# Patient Record
Sex: FEMALE | Race: BLACK OR AFRICAN AMERICAN | URBAN - METROPOLITAN AREA
[De-identification: names, ages, dates, MRNs, and addresses within clinical notes are randomized per-mention and may not be internally consistent; named-entity substitution may affect disease eponyms.]

---

## 2017-01-06 ENCOUNTER — PRENATAL OFFICE VISIT (OUTPATIENT)
Dept: MIDWIFE SERVICES | Facility: CLINIC | Age: 27
End: 2017-01-06
Payer: COMMERCIAL

## 2017-01-06 VITALS
DIASTOLIC BLOOD PRESSURE: 62 MMHG | BODY MASS INDEX: 30.8 KG/M2 | SYSTOLIC BLOOD PRESSURE: 98 MMHG | WEIGHT: 190.3 LBS | HEART RATE: 90 BPM | OXYGEN SATURATION: 96 %

## 2017-01-06 DIAGNOSIS — R11.0 NAUSEA: ICD-10-CM

## 2017-01-06 DIAGNOSIS — O99.820 GBS (GROUP B STREPTOCOCCUS CARRIER), +RV CULTURE, CURRENTLY PREGNANT: ICD-10-CM

## 2017-01-06 DIAGNOSIS — N89.8 VAGINAL ITCHING: Primary | ICD-10-CM

## 2017-01-06 LAB
MICRO REPORT STATUS: ABNORMAL
SPECIMEN SOURCE: ABNORMAL
WET PREP SPEC: ABNORMAL

## 2017-01-06 PROCEDURE — 87210 SMEAR WET MOUNT SALINE/INK: CPT | Performed by: ADVANCED PRACTICE MIDWIFE

## 2017-01-06 PROCEDURE — 99212 OFFICE O/P EST SF 10 MIN: CPT | Performed by: ADVANCED PRACTICE MIDWIFE

## 2017-01-06 RX ORDER — METRONIDAZOLE 500 MG/1
500 TABLET ORAL 2 TIMES DAILY
Qty: 14 TABLET | Refills: 0 | Status: SHIPPED | OUTPATIENT
Start: 2017-01-06 | End: 2017-01-13

## 2017-01-06 RX ORDER — TERCONAZOLE 0.4 %
1 CREAM WITH APPLICATOR VAGINAL AT BEDTIME
Qty: 45 G | Refills: 0 | Status: SHIPPED | OUTPATIENT
Start: 2017-01-06 | End: 2017-01-13

## 2017-01-06 RX ORDER — ONDANSETRON 4 MG/1
4 TABLET, FILM COATED ORAL EVERY 8 HOURS PRN
Qty: 18 TABLET | Refills: 0 | Status: ON HOLD | OUTPATIENT
Start: 2017-01-06 | End: 2017-01-21

## 2017-01-06 NOTE — PROGRESS NOTES
Is having recurrent yeast and BV issues.  Treated with MetroGel with vaginal irritation so changed to yeast OTC.  Feels BV is back.   Vaginal pH is >6 consistant with BV.  Discussed ctx and signs of labor with baby #3.  Phone number to call.  GBS is positive and knows to call if SROM or labor.  Last child had staph skin infection post delivery.  Etiology not clear.  Treated with topical and oral antibiotics.  ASSESSMENT: 37w6d  Yeast and BV on wet prep.    PLAN: Terazol and Flagyl ordered along with Zofran for periodic nausea at term.   ESHA

## 2017-01-20 ENCOUNTER — HOSPITAL ENCOUNTER (INPATIENT)
Facility: CLINIC | Age: 27
LOS: 2 days | Discharge: HOME HEALTH | End: 2017-01-22
Attending: ADVANCED PRACTICE MIDWIFE | Admitting: ADVANCED PRACTICE MIDWIFE
Payer: COMMERCIAL

## 2017-01-20 ENCOUNTER — PRENATAL OFFICE VISIT (OUTPATIENT)
Dept: MIDWIFE SERVICES | Facility: CLINIC | Age: 27
End: 2017-01-20
Payer: COMMERCIAL

## 2017-01-20 ENCOUNTER — TELEPHONE (OUTPATIENT)
Dept: NURSING | Facility: CLINIC | Age: 27
End: 2017-01-20

## 2017-01-20 VITALS
HEART RATE: 89 BPM | DIASTOLIC BLOOD PRESSURE: 67 MMHG | OXYGEN SATURATION: 98 % | BODY MASS INDEX: 31.4 KG/M2 | WEIGHT: 194 LBS | SYSTOLIC BLOOD PRESSURE: 91 MMHG

## 2017-01-20 DIAGNOSIS — Z34.80 SUPERVISION OF OTHER NORMAL PREGNANCY, ANTEPARTUM: ICD-10-CM

## 2017-01-20 DIAGNOSIS — D50.8 IRON DEFICIENCY ANEMIA SECONDARY TO INADEQUATE DIETARY IRON INTAKE: Primary | ICD-10-CM

## 2017-01-20 DIAGNOSIS — O99.820 GBS (GROUP B STREPTOCOCCUS CARRIER), +RV CULTURE, CURRENTLY PREGNANT: Primary | ICD-10-CM

## 2017-01-20 DIAGNOSIS — D50.8 OTHER IRON DEFICIENCY ANEMIA: ICD-10-CM

## 2017-01-20 LAB
ABO + RH BLD: NORMAL
ABO + RH BLD: NORMAL
HGB BLD-MCNC: 9.7 G/DL (ref 11.7–15.7)
SPECIMEN EXP DATE BLD: NORMAL

## 2017-01-20 PROCEDURE — 00000218 ZZHCL STATISTIC OBHBG - HEMOGLOBIN: Performed by: ADVANCED PRACTICE MIDWIFE

## 2017-01-20 PROCEDURE — 25000125 ZZHC RX 250: Performed by: ADVANCED PRACTICE MIDWIFE

## 2017-01-20 PROCEDURE — 99215 OFFICE O/P EST HI 40 MIN: CPT

## 2017-01-20 PROCEDURE — 12000032 ZZH R&B OB CRITICAL UMMC

## 2017-01-20 PROCEDURE — 25000128 H RX IP 250 OP 636: Performed by: ADVANCED PRACTICE MIDWIFE

## 2017-01-20 PROCEDURE — 25000125 ZZHC RX 250

## 2017-01-20 PROCEDURE — 99212 OFFICE O/P EST SF 10 MIN: CPT | Performed by: ADVANCED PRACTICE MIDWIFE

## 2017-01-20 PROCEDURE — 36416 COLLJ CAPILLARY BLOOD SPEC: CPT | Performed by: ADVANCED PRACTICE MIDWIFE

## 2017-01-20 PROCEDURE — 86900 BLOOD TYPING SEROLOGIC ABO: CPT | Performed by: ADVANCED PRACTICE MIDWIFE

## 2017-01-20 PROCEDURE — 86901 BLOOD TYPING SEROLOGIC RH(D): CPT | Performed by: ADVANCED PRACTICE MIDWIFE

## 2017-01-20 PROCEDURE — 12000030 ZZH R&B OB INTERMEDIATE UMMC

## 2017-01-20 RX ORDER — FENTANYL CITRATE 50 UG/ML
50-100 INJECTION, SOLUTION INTRAMUSCULAR; INTRAVENOUS
Status: DISCONTINUED | OUTPATIENT
Start: 2017-01-20 | End: 2017-01-21

## 2017-01-20 RX ORDER — NALOXONE HYDROCHLORIDE 0.4 MG/ML
.1-.4 INJECTION, SOLUTION INTRAMUSCULAR; INTRAVENOUS; SUBCUTANEOUS
Status: DISCONTINUED | OUTPATIENT
Start: 2017-01-20 | End: 2017-01-21

## 2017-01-20 RX ORDER — SODIUM CHLORIDE, SODIUM LACTATE, POTASSIUM CHLORIDE, CALCIUM CHLORIDE 600; 310; 30; 20 MG/100ML; MG/100ML; MG/100ML; MG/100ML
INJECTION, SOLUTION INTRAVENOUS CONTINUOUS
Status: DISCONTINUED | OUTPATIENT
Start: 2017-01-20 | End: 2017-01-21

## 2017-01-20 RX ORDER — OXYTOCIN/0.9 % SODIUM CHLORIDE 30/500 ML
PLASTIC BAG, INJECTION (ML) INTRAVENOUS
Status: COMPLETED
Start: 2017-01-20 | End: 2017-01-21

## 2017-01-20 RX ORDER — MICONAZOLE NITRATE 2 %
1 CREAM WITH APPLICATOR VAGINAL AT BEDTIME
Status: ON HOLD | COMMUNITY
End: 2017-01-21

## 2017-01-20 RX ORDER — PENICILLIN G POTASSIUM 5000000 [IU]/1
INJECTION, POWDER, FOR SOLUTION INTRAMUSCULAR; INTRAVENOUS
Status: COMPLETED
Start: 2017-01-20 | End: 2017-01-20

## 2017-01-20 RX ORDER — METHYLERGONOVINE MALEATE 0.2 MG/ML
200 INJECTION INTRAVENOUS
Status: DISCONTINUED | OUTPATIENT
Start: 2017-01-20 | End: 2017-01-21

## 2017-01-20 RX ORDER — OXYCODONE AND ACETAMINOPHEN 5; 325 MG/1; MG/1
1 TABLET ORAL
Status: DISCONTINUED | OUTPATIENT
Start: 2017-01-20 | End: 2017-01-21

## 2017-01-20 RX ORDER — IBUPROFEN 800 MG/1
800 TABLET, FILM COATED ORAL
Status: DISCONTINUED | OUTPATIENT
Start: 2017-01-20 | End: 2017-01-21

## 2017-01-20 RX ORDER — CARBOPROST TROMETHAMINE 250 UG/ML
250 INJECTION, SOLUTION INTRAMUSCULAR
Status: DISCONTINUED | OUTPATIENT
Start: 2017-01-20 | End: 2017-01-21

## 2017-01-20 RX ORDER — SODIUM CHLORIDE, SODIUM LACTATE, POTASSIUM CHLORIDE, CALCIUM CHLORIDE 600; 310; 30; 20 MG/100ML; MG/100ML; MG/100ML; MG/100ML
INJECTION, SOLUTION INTRAVENOUS
Status: DISCONTINUED
Start: 2017-01-20 | End: 2017-01-21 | Stop reason: HOSPADM

## 2017-01-20 RX ORDER — OXYTOCIN 10 [USP'U]/ML
INJECTION, SOLUTION INTRAMUSCULAR; INTRAVENOUS
Status: DISCONTINUED
Start: 2017-01-20 | End: 2017-01-21 | Stop reason: WASHOUT

## 2017-01-20 RX ORDER — PENICILLIN G POTASSIUM 5000000 [IU]/1
5 INJECTION, POWDER, FOR SOLUTION INTRAMUSCULAR; INTRAVENOUS ONCE
Status: COMPLETED | OUTPATIENT
Start: 2017-01-20 | End: 2017-01-20

## 2017-01-20 RX ORDER — OXYTOCIN/0.9 % SODIUM CHLORIDE 30/500 ML
100-340 PLASTIC BAG, INJECTION (ML) INTRAVENOUS CONTINUOUS PRN
Status: COMPLETED | OUTPATIENT
Start: 2017-01-20 | End: 2017-01-21

## 2017-01-20 RX ORDER — OXYTOCIN 10 [USP'U]/ML
10 INJECTION, SOLUTION INTRAMUSCULAR; INTRAVENOUS
Status: DISCONTINUED | OUTPATIENT
Start: 2017-01-20 | End: 2017-01-21

## 2017-01-20 RX ORDER — SODIUM CHLORIDE, SODIUM LACTATE, POTASSIUM CHLORIDE, CALCIUM CHLORIDE 600; 310; 30; 20 MG/100ML; MG/100ML; MG/100ML; MG/100ML
INJECTION, SOLUTION INTRAVENOUS
Status: DISCONTINUED
Start: 2017-01-20 | End: 2017-01-20 | Stop reason: WASHOUT

## 2017-01-20 RX ORDER — ACETAMINOPHEN 325 MG/1
650 TABLET ORAL EVERY 4 HOURS PRN
Status: DISCONTINUED | OUTPATIENT
Start: 2017-01-20 | End: 2017-01-21

## 2017-01-20 RX ORDER — ONDANSETRON 2 MG/ML
4 INJECTION INTRAMUSCULAR; INTRAVENOUS EVERY 6 HOURS PRN
Status: DISCONTINUED | OUTPATIENT
Start: 2017-01-20 | End: 2017-01-21

## 2017-01-20 RX ORDER — FAMOTIDINE 10 MG
40 TABLET ORAL 2 TIMES DAILY
Status: DISCONTINUED | OUTPATIENT
Start: 2017-01-20 | End: 2017-01-21

## 2017-01-20 RX ADMIN — FENTANYL CITRATE 100 MCG: 50 INJECTION, SOLUTION INTRAMUSCULAR; INTRAVENOUS at 22:05

## 2017-01-20 RX ADMIN — FENTANYL CITRATE 50 MCG: 50 INJECTION, SOLUTION INTRAMUSCULAR; INTRAVENOUS at 20:36

## 2017-01-20 RX ADMIN — FENTANYL CITRATE 100 MCG: 50 INJECTION, SOLUTION INTRAMUSCULAR; INTRAVENOUS at 23:14

## 2017-01-20 RX ADMIN — PENICILLIN G POTASSIUM 5 MILLION UNITS: 5000000 INJECTION, POWDER, FOR SOLUTION INTRAMUSCULAR; INTRAVENOUS at 20:06

## 2017-01-20 RX ADMIN — Medication 2.5 MILLION UNITS: at 23:58

## 2017-01-20 RX ADMIN — PENICILLIN G POTASSIUM 5 MILLION UNITS: 5000000 POWDER, FOR SOLUTION INTRAMUSCULAR; INTRAPLEURAL; INTRATHECAL; INTRAVENOUS at 20:06

## 2017-01-20 RX ADMIN — FENTANYL CITRATE 50 MCG: 50 INJECTION, SOLUTION INTRAMUSCULAR; INTRAVENOUS at 20:22

## 2017-01-20 NOTE — MR AVS SNAPSHOT
After Visit Summary   1/20/2017    Alisha Marie    MRN: [id number]           Patient Information     Date Of Birth          1990        Visit Information        Provider Department      1/20/2017 1:00 PM Mikayla Aaron CNM [facility]        Today's Diagnoses     GBS (group B Streptococcus carrier), +RV culture, currently pregnant    -  1     Supervision of other normal pregnancy, antepartum         Other iron deficiency anemia            Follow-ups after your visit        Follow-up notes from your care team     Return in about 1 week (around 1/27/2017) for Prenatal care with KAVYA, NST and US.      Future tests that were ordered for you today     Open Future Orders        Priority Expected Expires Ordered    US OB > 14 Weeks Complete Single Routine  1/20/2018 1/20/2017            Who to contact     If you have questions or need follow up information about today's clinic visit or your schedule please contact [facility] directly at [phone].  Normal or non-critical lab and imaging results will be communicated to you by Disqushart, letter or phone within 4 business days after the clinic has received the results. If you do not hear from us within 7 days, please contact the clinic through Disqushart or phone. If you have a critical or abnormal lab result, we will notify you by phone as soon as possible.  Submit refill requests through Talend or call your pharmacy and they will forward the refill request to us. Please allow 3 business days for your refill to be completed.          Additional Information About Your Visit        MyChart Information     Talend gives you secure access to your electronic health record. If you see a primary care provider, you can also send messages to your care team and make appointments. If you have questions, please call your primary care clinic.  If you do not have a primary care provider, please call [phone] and they will  assist you.        Care EveryWhere ID     This is your Care EveryWhere ID. This could be used by other organizations to access your [place] medical records  [id number]        Your Vitals Were     Pulse Pulse Oximetry Last Period Breastfeeding?          89 98% 04/16/2016 (Approximate) No         Blood Pressure from Last 3 Encounters:   01/20/17 91/67   01/06/17 98/62   12/22/16 92/67    Weight from Last 3 Encounters:   01/20/17 194 lb (87.998 kg)   01/06/17 190 lb 4.8 oz (86.32 kg)   12/22/16 191 lb (86.637 kg)              We Performed the Following     OB hemoglobin        Primary Care Provider    Physician No Ref-Primary       No address on file        Thank you!     Thank you for choosing [facility]  for your care. Our goal is always to provide you with excellent care. Hearing back from our patients is one way we can continue to improve our services. Please take a few minutes to complete the written survey that you may receive in the mail after your visit with us. Thank you!             Your Updated Medication List - Protect others around you: Learn how to safely use, store and throw away your medicines at www.disposemymeds.org.          This list is accurate as of: 1/20/17  1:50 PM.  Always use your most recent med list.                   Brand Name Dispense Instructions for use    famotidine 40 MG tablet    PEPCID    30 tablet    Take 1 tablet (40 mg) by mouth nightly as needed for heartburn       hydrocortisone valerate 0.2 % ointment    WEST-SUSSY    30 g    Apply topically 2 times daily       ondansetron 4 MG tablet    ZOFRAN    18 tablet    Take 1 tablet (4 mg) by mouth every 8 hours as needed for nausea       valACYclovir 500 MG tablet    VALTREX    6 tablet    Take 1 tablet (500 mg) by mouth 2 times daily

## 2017-01-20 NOTE — IP AVS SNAPSHOT
MRN:[id number]                      After Visit Summary   1/20/2017    Alisha Marie    MRN: [id number]           Thank you!     Thank you for choosing [facility] for your care. Our goal is always to provide you with excellent care. Hearing back from our patients is one way we can continue to improve our services. Please take a few minutes to complete the written survey that you may receive in the mail after you visit with us. Thank you!        Patient Information     Date Of Birth          1990        About your hospital stay     You were admitted on:  January 20, 2017 [facility]    You were discharged on:  January 22, 2017       Who to Call     For medical emergencies, please call 911.  For non-urgent questions about your medical care, please call your primary care provider or clinic, None          Attending Provider     Provider    Dania Milligan APRN CNM       Primary Care Provider    Physician No Ref-Primary       No address on file        Further instructions from your care team       Postpartum Vaginal Delivery Instructions    Activity       Ask family and friends for help when you need it.    Do not place anything in your vagina for 6 weeks.    You are not restricted on other activities, but take it easy for a few weeks to allow your body to recover from delivery.  You are able to do any activities you feel up to that point.    No driving until you have stopped taking your pain medications (usually two weeks after delivery).     Call your health care provider if you have any of these symptoms:       Increased pain, swelling, redness, or fluid around your stiches from an episiotomy or perineal tear.    A fever above 100.4 F (38 C) with or without chills when placing a thermometer under your tongue.    You soak a sanitary pad with blood within 1 hour, or you see blood clots larger than a golf ball.    Bleeding that lasts more than 6 weeks.    Vaginal  "discharge that smells bad.    Severe pain, cramping or tenderness in your lower belly area.    A need to urinate more frequently (use the toilet more often), more urgently (use the toilet very quickly), or it burns when you urinate.    Nausea and vomiting.    Redness, swelling or pain around a vein in your leg.    Problems breastfeeding or a red or painful area on your breast.    Chest pain and cough or are gasping for air.    Problems coping with sadness, anxiety, or depression.  If you have any concerns about hurting yourself or the baby, call your provider immediately.     You have questions or concerns after you return home.     Keep your hands clean:  Always wash your hands before touching your perineal area and stitches.  This helps reduce your risk of infection.  If your hands aren't dirty, you may use an alcohol hand-rub to clean your hands. Keep your nails clean and short.        Pending Results     Date and Time Order Name Status Description    1/22/2017 0630 Rh Immune Globulin Study In process             Statement of Approval     Ordered          01/22/17 0828  I have reviewed and agree with all the recommendations and orders detailed in this document.   EFFECTIVE NOW     Approved and electronically signed by:  Dania Milligan APRN CNM             Admission Information        Provider Department Dept Phone    1/20/2017 CHELSY Marquis CNM [facility] [phone]      Your Vitals Were     Blood Pressure Pulse Temperature    100/56 mmHg 72 97.7  F (36.5  C) (Oral)    Respirations Height Weight    16 1.676 m (5' 6\") 87.998 kg (194 lb)    BMI (Body Mass Index) Last Period       31.33 kg/m2 04/16/2016 (Approximate)       MyChart Information     Squarespace gives you secure access to your electronic health record. If you see a primary care provider, you can also send messages to your care team and make appointments. If you have questions, please call your primary care clinic.  If you do not have a primary " care provider, please call [phone] and they will assist you.        Care EveryWhere ID     This is your Care EveryWhere ID. This could be used by other organizations to access your [place] medical records  [id number]           Review of your medicines      START taking        Dose / Directions    * ferrous sulfate 325 (65 FE) MG tablet   Commonly known as:  IRON   Used for:  Iron deficiency anemia secondary to inadequate dietary iron intake        Dose:  325 mg   Take 1 tablet (325 mg) by mouth daily (with breakfast)   Quantity:  60 tablet   Refills:  0       * ferrous sulfate 325 (65 FE) MG tablet   Commonly known as:  IRON SUPPLEMENT   Used for:  Iron deficiency anemia secondary to inadequate dietary iron intake        Dose:  325 mg   Take 1 tablet (325 mg) by mouth daily (with breakfast)   Quantity:  100 tablet   Refills:  0       oxyCODONE 5 MG IR tablet   Commonly known as:  ROXICODONE   Used for:   (normal spontaneous vaginal delivery)        Dose:  5 mg   Take 1 tablet (5 mg) by mouth every 4 hours as needed for moderate to severe pain   Quantity:  10 tablet   Refills:  0       senna-docusate 8.6-50 MG per tablet   Commonly known as:  SENOKOT-S;PERICOLACE   Used for:   (normal spontaneous vaginal delivery)        Dose:  1 tablet   Take 1 tablet by mouth 2 times daily as needed for constipation   Quantity:  60 tablet   Refills:  1       * Notice:  This list has 2 medication(s) that are the same as other medications prescribed for you. Read the directions carefully, and ask your doctor or other care provider to review them with you.      CONTINUE these medicines which have NOT CHANGED        Dose / Directions    famotidine 40 MG tablet   Commonly known as:  PEPCID   Used for:  Reflex abnormality        Dose:  40 mg   Take 1 tablet (40 mg) by mouth nightly as needed for heartburn   Quantity:  30 tablet   Refills:  1       hydrocortisone valerate 0.2 % ointment   Commonly known as:  WEST-SUSSY    Used for:  Rash and nonspecific skin eruption        Apply topically 2 times daily   Quantity:  30 g   Refills:  1         STOP taking     FLAGYL PO           miconazole 2 % cream   Commonly known as:  MICATIN           ondansetron 4 MG tablet   Commonly known as:  ZOFRAN           valACYclovir 500 MG tablet   Commonly known as:  VALTREX                Where to get your medicines      These medications were sent to [facility] [place], [place] - [address]  [address] [facility] [address], [facility] [address]     Phone:  [phone]    - ferrous sulfate 325 (65 FE) MG tablet  - ferrous sulfate 325 (65 FE) MG tablet  - senna-docusate 8.6-50 MG per tablet      Some of these will need a paper prescription and others can be bought over the counter. Ask your nurse if you have questions.     Bring a paper prescription for each of these medications    - oxyCODONE 5 MG IR tablet             Protect others around you: Learn how to safely use, store and throw away your medicines at www.disposemymeds.org.             Medication List: This is a list of all your medications and when to take them. Check marks below indicate your daily home schedule. Keep this list as a reference.      Medications           Morning Afternoon Evening Bedtime As Needed    famotidine 40 MG tablet   Commonly known as:  PEPCID   Take 1 tablet (40 mg) by mouth nightly as needed for heartburn                                * ferrous sulfate 325 (65 FE) MG tablet   Commonly known as:  IRON   Take 1 tablet (325 mg) by mouth daily (with breakfast)   Last time this was given:  325 mg on 1/22/2017  8:01 AM                                * ferrous sulfate 325 (65 FE) MG tablet   Commonly known as:  IRON SUPPLEMENT   Take 1 tablet (325 mg) by mouth daily (with breakfast)   Last time this was given:  325 mg on 1/22/2017  8:01 AM                                hydrocortisone valerate 0.2 % ointment   Commonly known as:  WEST-SUSSY   Apply topically  2 times daily                                oxyCODONE 5 MG IR tablet   Commonly known as:  ROXICODONE   Take 1 tablet (5 mg) by mouth every 4 hours as needed for moderate to severe pain   Last time this was given:  10 mg on 1/22/2017  8:13 AM                                senna-docusate 8.6-50 MG per tablet   Commonly known as:  SENOKOT-S;PERICOLACE   Take 1 tablet by mouth 2 times daily as needed for constipation   Last time this was given:  2 tablets on 1/22/2017  8:01 AM                                * Notice:  This list has 2 medication(s) that are the same as other medications prescribed for you. Read the directions carefully, and ask your doctor or other care provider to review them with you.

## 2017-01-20 NOTE — IP AVS SNAPSHOT
[facility]    [address] [facility] [address] [facility] [address]    Phone:  [phone]                                       After Visit Summary   1/20/2017    Alisha Marie    MRN: [id number]           After Visit Summary Signature Page     I have received my discharge instructions, and my questions have been answered. I have discussed any challenges I see with this plan with the nurse or doctor.    ..........................................................................................................................................  Patient/Patient Representative Signature      ..........................................................................................................................................  Patient Representative Print Name and Relationship to Patient    ..................................................               ................................................  Date                                            Time    ..........................................................................................................................................  Reviewed by Signature/Title    ...................................................              ..............................................  Date                                                            Time

## 2017-01-20 NOTE — TELEPHONE ENCOUNTER
"Call Type: Triage Call    Presenting Problem: 'I am calling to let you know I am on myway to  the hospital.  They stripped my membranes today and now my  contractions are 5 minutes apart.\"  Call placed via  to S  midwife to call patient on her cell [phone] @ [phone]. Patient is  enroute. Third pregnancy.  Triage Note:  Guideline Title: Pregnancy: Signs of Labor, 37 Weeks or Greater  Recommended Disposition: Call Provider Immediately  Original Inclination:  Override Disposition:  Intended Action:  Physician Contacted: No  Previous childbirth AND moderate intensity contractions less than 5 minutes apart  for 1 hour or history of rapid delivery (less than 6 hours of labor) ?  YES  First childbirth with regular contractions for 1 hour and contractions are  feeling stronger and closer together. ? NO  New or worsening signs and symptoms that may indicate shock ? NO  Feeling of baby coming or wanting to push (urge to bear down) ? NO  Umbilical cord or any part of the baby (head, bottom, arm or leg) at the opening  of the vagina ? NO  Gestation 20 to 37 weeks ? NO  Gush or leakage of green or green-tinged or port-wine colored fluid (reddish and  watery) from the vagina ? NO  Decreased fetal movement (less than 10 kicks/movements within two hours or a  significant change in usual pattern compared to previous days) ? NO  No relief between contractions ? NO  Continuous bright red vaginal bleeding for more than 15 minutes (more than  spotting) ? NO  Physician Instructions:  Care Advice:  "

## 2017-01-20 NOTE — PROGRESS NOTES
Alisha is a 26 year old  @ 39+6 by LMP who presents for a scheduled  visit.  States that she is feeling well.  Denies vaginal leaking, bleeding, and discharge.  Is having irregular uterine contractions of varying intensity and frequency.  FHR 120s, + fetal movement, vertex presentation by leopold's. GBS +, will plan for intrapartum prophylaxis.  Last hemoglobin on 16 9.6.  Rechecked today and found to be 9.7.  Instructed Alisha to take her iron supplements BID-TID and to eat iron-rich foods.  Verbalized understanding and acknowledged importance of adequate iron stores during pregnancy, labor, and birth.  History of HSV, last outbreak in October.  Is currently taking valacyclovir as prophylaxis.  No lesions visible on external genitalia.  Had episode of BV/yeast on 17.  States treatment was successful and conditions have since resolved.  Cervix checked per request and found to be 3/50/-4, soft, and posterior.  Membranes swept per her request. Tolerated well. Gave Roberta and her partner detailed labor precautions and instructions on how to contact on-call midwife/L&D.  Verbalized understanding.  Will return to clinic for appt and postdates testing (US ordered) in one week if still pregnant.

## 2017-01-21 LAB — BLOOD BANK CMNT PATIENT-IMP: NORMAL

## 2017-01-21 PROCEDURE — 12000030 ZZH R&B OB INTERMEDIATE UMMC

## 2017-01-21 PROCEDURE — 25000125 ZZHC RX 250: Performed by: ADVANCED PRACTICE MIDWIFE

## 2017-01-21 PROCEDURE — 10907ZC DRAINAGE OF AMNIOTIC FLUID, THERAPEUTIC FROM PRODUCTS OF CONCEPTION, VIA NATURAL OR ARTIFICIAL OPENING: ICD-10-PCS | Performed by: ADVANCED PRACTICE MIDWIFE

## 2017-01-21 PROCEDURE — 72200001 ZZH LABOR CARE VAGINAL DELIVERY SINGLE

## 2017-01-21 PROCEDURE — 25000132 ZZH RX MED GY IP 250 OP 250 PS 637: Performed by: ADVANCED PRACTICE MIDWIFE

## 2017-01-21 PROCEDURE — 59410 OBSTETRICAL CARE: CPT | Performed by: ADVANCED PRACTICE MIDWIFE

## 2017-01-21 PROCEDURE — 25000125 ZZHC RX 250

## 2017-01-21 RX ORDER — FERROUS SULFATE 325(65) MG
325 TABLET ORAL
Qty: 60 TABLET | Refills: 0 | Status: SHIPPED | OUTPATIENT
Start: 2017-01-21 | End: 2017-03-15

## 2017-01-21 RX ORDER — AMOXICILLIN 250 MG
1-2 CAPSULE ORAL 2 TIMES DAILY
Status: DISCONTINUED | OUTPATIENT
Start: 2017-01-21 | End: 2017-01-22 | Stop reason: HOSPADM

## 2017-01-21 RX ORDER — IBUPROFEN 400 MG/1
400-800 TABLET, FILM COATED ORAL EVERY 6 HOURS PRN
Status: DISCONTINUED | OUTPATIENT
Start: 2017-01-21 | End: 2017-01-22 | Stop reason: HOSPADM

## 2017-01-21 RX ORDER — OXYTOCIN/0.9 % SODIUM CHLORIDE 30/500 ML
100 PLASTIC BAG, INJECTION (ML) INTRAVENOUS CONTINUOUS
Status: DISCONTINUED | OUTPATIENT
Start: 2017-01-21 | End: 2017-01-22 | Stop reason: HOSPADM

## 2017-01-21 RX ORDER — AMOXICILLIN 250 MG
1 CAPSULE ORAL 2 TIMES DAILY PRN
Qty: 60 TABLET | Refills: 1 | Status: SHIPPED | OUTPATIENT
Start: 2017-01-21

## 2017-01-21 RX ORDER — FERROUS SULFATE 325(65) MG
325 TABLET ORAL DAILY
Status: DISCONTINUED | OUTPATIENT
Start: 2017-01-21 | End: 2017-01-22 | Stop reason: HOSPADM

## 2017-01-21 RX ORDER — OXYCODONE AND ACETAMINOPHEN 5; 325 MG/1; MG/1
1-2 TABLET ORAL EVERY 4 HOURS PRN
Status: DISCONTINUED | OUTPATIENT
Start: 2017-01-21 | End: 2017-01-21

## 2017-01-21 RX ORDER — OXYTOCIN/0.9 % SODIUM CHLORIDE 30/500 ML
340 PLASTIC BAG, INJECTION (ML) INTRAVENOUS CONTINUOUS PRN
Status: DISCONTINUED | OUTPATIENT
Start: 2017-01-21 | End: 2017-01-22 | Stop reason: HOSPADM

## 2017-01-21 RX ORDER — HYDROCORTISONE 2.5 %
CREAM (GRAM) TOPICAL 3 TIMES DAILY PRN
Status: DISCONTINUED | OUTPATIENT
Start: 2017-01-21 | End: 2017-01-22 | Stop reason: HOSPADM

## 2017-01-21 RX ORDER — MISOPROSTOL 200 UG/1
400 TABLET ORAL
Status: DISCONTINUED | OUTPATIENT
Start: 2017-01-21 | End: 2017-01-22 | Stop reason: HOSPADM

## 2017-01-21 RX ORDER — HYDROCODONE BITARTRATE AND ACETAMINOPHEN 5; 325 MG/1; MG/1
1-2 TABLET ORAL EVERY 4 HOURS PRN
Status: DISCONTINUED | OUTPATIENT
Start: 2017-01-21 | End: 2017-01-21 | Stop reason: ALTCHOICE

## 2017-01-21 RX ORDER — NALOXONE HYDROCHLORIDE 0.4 MG/ML
.1-.4 INJECTION, SOLUTION INTRAMUSCULAR; INTRAVENOUS; SUBCUTANEOUS
Status: DISCONTINUED | OUTPATIENT
Start: 2017-01-21 | End: 2017-01-22 | Stop reason: HOSPADM

## 2017-01-21 RX ORDER — LANOLIN 100 %
OINTMENT (GRAM) TOPICAL
Status: DISCONTINUED | OUTPATIENT
Start: 2017-01-21 | End: 2017-01-22 | Stop reason: HOSPADM

## 2017-01-21 RX ORDER — ACETAMINOPHEN 325 MG/1
650 TABLET ORAL EVERY 4 HOURS PRN
Status: DISCONTINUED | OUTPATIENT
Start: 2017-01-21 | End: 2017-01-22 | Stop reason: HOSPADM

## 2017-01-21 RX ORDER — OXYCODONE HYDROCHLORIDE 5 MG/1
5-10 TABLET ORAL EVERY 4 HOURS PRN
Status: DISCONTINUED | OUTPATIENT
Start: 2017-01-21 | End: 2017-01-22 | Stop reason: HOSPADM

## 2017-01-21 RX ORDER — SIMETHICONE 80 MG
80 TABLET,CHEWABLE ORAL EVERY 6 HOURS PRN
Status: DISCONTINUED | OUTPATIENT
Start: 2017-01-21 | End: 2017-01-22 | Stop reason: HOSPADM

## 2017-01-21 RX ORDER — BISACODYL 10 MG
10 SUPPOSITORY, RECTAL RECTAL DAILY PRN
Status: DISCONTINUED | OUTPATIENT
Start: 2017-01-23 | End: 2017-01-22 | Stop reason: HOSPADM

## 2017-01-21 RX ORDER — OXYTOCIN 10 [USP'U]/ML
10 INJECTION, SOLUTION INTRAMUSCULAR; INTRAVENOUS
Status: DISCONTINUED | OUTPATIENT
Start: 2017-01-21 | End: 2017-01-22 | Stop reason: HOSPADM

## 2017-01-21 RX ADMIN — OXYCODONE HYDROCHLORIDE 10 MG: 5 TABLET ORAL at 23:04

## 2017-01-21 RX ADMIN — Medication 340 ML/HR: at 00:22

## 2017-01-21 RX ADMIN — IBUPROFEN 800 MG: 400 TABLET ORAL at 16:07

## 2017-01-21 RX ADMIN — IBUPROFEN 800 MG: 400 TABLET ORAL at 01:40

## 2017-01-21 RX ADMIN — IRON 325 MG: 65 TABLET ORAL at 14:15

## 2017-01-21 RX ADMIN — FENTANYL CITRATE 100 MCG: 50 INJECTION, SOLUTION INTRAMUSCULAR; INTRAVENOUS at 00:39

## 2017-01-21 RX ADMIN — SIMETHICONE CHEW TAB 80 MG 80 MG: 80 TABLET ORAL at 20:16

## 2017-01-21 RX ADMIN — IBUPROFEN 800 MG: 400 TABLET ORAL at 08:35

## 2017-01-21 RX ADMIN — OXYCODONE HYDROCHLORIDE AND ACETAMINOPHEN 1 TABLET: 5; 325 TABLET ORAL at 02:54

## 2017-01-21 RX ADMIN — OXYCODONE HYDROCHLORIDE AND ACETAMINOPHEN 2 TABLET: 5; 325 TABLET ORAL at 14:15

## 2017-01-21 RX ADMIN — OXYCODONE HYDROCHLORIDE AND ACETAMINOPHEN 2 TABLET: 5; 325 TABLET ORAL at 18:12

## 2017-01-21 RX ADMIN — ACETAMINOPHEN 650 MG: 325 TABLET, FILM COATED ORAL at 03:43

## 2017-01-21 RX ADMIN — OXYCODONE HYDROCHLORIDE AND ACETAMINOPHEN 2 TABLET: 5; 325 TABLET ORAL at 09:46

## 2017-01-21 RX ADMIN — SENNOSIDES AND DOCUSATE SODIUM 2 TABLET: 8.6; 5 TABLET ORAL at 08:35

## 2017-01-21 RX ADMIN — OXYTOCIN-SODIUM CHLORIDE 0.9% IV SOLN 30 UNIT/500ML 100 ML/HR: 30-0.9/5 SOLUTION at 00:52

## 2017-01-21 RX ADMIN — OXYTOCIN-SODIUM CHLORIDE 0.9% IV SOLN 30 UNIT/500ML 340 ML/HR: 30-0.9/5 SOLUTION at 00:22

## 2017-01-21 RX ADMIN — IBUPROFEN 800 MG: 400 TABLET ORAL at 22:27

## 2017-01-21 RX ADMIN — OXYCODONE HYDROCHLORIDE AND ACETAMINOPHEN 1 TABLET: 5; 325 TABLET ORAL at 03:43

## 2017-01-21 RX ADMIN — SENNOSIDES AND DOCUSATE SODIUM 2 TABLET: 8.6; 5 TABLET ORAL at 19:55

## 2017-01-21 RX ADMIN — ACETAMINOPHEN 650 MG: 325 TABLET, FILM COATED ORAL at 08:35

## 2017-01-21 NOTE — PLAN OF CARE
Data: Patient presented to [facility] at 1930.   Reason for maternal/fetal assessment per patient is Laboring  .  Patient is a . Prenatal record reviewed. Pt had clinic appointment today and had her membranes stripped. Has been having regular contractions, about every 5 minutes, since then.      Obstetric History       T2      TAB0   SAB0   E0   M0   L2       # Outcome Date GA Lbr Danyel/2nd Weight Sex Delivery Anes PTL Lv   4 Current            3 Term  40w0d  3.26 kg (7 lb 3 oz) F    Y      Name: Ben   2 Term  42w0d  4.082 kg (9 lb) M    Y      Name: Magalie Leon                Obstetric Comments   Partner has daughter also, Noe.    . Medical history:   Past Medical History   Diagnosis Date     NO ACTIVE PROBLEMS    . Gestational Age 39w6d. VSS. Fetal movement present. Patient denies cramping, backache, vaginal discharge, pelvic pressure, UTI symptoms, GI problems, bloody show, vaginal bleeding, edema, headache, visual disturbances, epigastric or URQ pain, abdominal pain, rupture of membranes. Support persons significant other, Joe present.  Action: Verbal consent for EFM. Triage assessment completed. EFM applied for assessment. Uterine assessment TOCO applied. Fetal assessment: Presumed adequate fetal oxygenation documented (see flow record).   Response: Dania Ulrich CNM informed of arrival. Cervix, 5cm. Plan per provider is admit, start PCN. Patient verbalized agreement with plan. Patient transferred to room 481 ambulatory, oriented to room and call light.

## 2017-01-21 NOTE — L&D DELIVERY NOTE
Delivery Note  AROM at 2334 for clear fluid. Contractions became increasingly stronger and patient had urge push. Went on to effectively push to the birth of a viable male infant.   IUP at 40 weeks 0 days gestation delivered on 17 @ 0017.     delivery of a viable male infant; weight pending.  Apgars of 7 at 1 minute and 9 at 5 minutes.  Labor was spontaneous.  Medications administered  in labor:  Pain Rx narcotics ; Antibiotics Yes: PCN x2 doses  Perineum: Intact, small abrasions on both labia - hemostatic, no repair necessary  Placenta-mechanism: spontaneous, intact,  with a 3 vessel cord. IV oxytocin was given.  Estimated Blood Loss was 400.  Complications of pregnancy, labor and delivery: GBS+, 2 doses of abx  Birth attendants: Dania Milligan CNM    Delivery Summary    Alisha Marie MRN# [id number]   Age: 26 year old YOB: 1990     Labor Event Times:    Labor Onset Date         Labor Onset Time      Dilation Complete Date      Dilation Complete Time         Start Pushing Date         Start Pushing Time             Labor Length:    1st Stage (hrs/min)         2nd Stage (hrs/min)         3rd Stage (hrs/min)             Labor Events:     Labor No    Indicator      Rupture Date 2017    Rupture Time 11:34 pm    Rupture Type Artificial rupture of membranes [5]    Fluid Color Clear    Labor Type Spontaneous    Induction      Induction Indication          Augmentation      Labor Complications      Additional Complications      Management of Labor IV Antibiotics       Antibiotics Penicillin    IV Antibiotic Given Greater than 4 hours prior to delivery    Additional Management      Fetal Status Prior to  Delivery Category 2    Fetal Status Comments          Cervical Ripening:    Date       Time       Type           Delivery:    Episiotomy None    Local Anesthetic         Lacerations None    Sponge Count Correct        Needle Count Correct      Final Count by: SERGEY Monsalve  KAVYA Milligan    Sutures      Vaginal delivery est. blood loss (mL      Surgical or additional est. blood loss (mL)      Combined est. blood loss (mL):      Packing Intentionally Left In            Information for the patient's :  Rojas Marie [[id number]]       Delivery  2017 12:17 AM by  Vaginal, Spontaneous Delivery  Sex:  male Gestational Age: 40w0d  Delivery Clinician:  Dania Milligan  Living?: Yes          APGARS  One minute Five minutes Ten minutes   Skin color: 0   1        Heart rate: 2   2        Grimace: 2   2        Muscle tone: 2   2        Breathin   2        Totals: 7  9         Presentation/position: Vertex  Left Occiput Anterior  Resuscitation and Interventions: Method:  None  Oxygen Type:     Intubation Time:   # of Attempts:     ETT Size:        Tracheal Suction:     Tracheal returns:      [place] Care at Delivery:  Infant without spontaneous cry to mothers' abdomen where he was further dried and stimulated. At one minute, infant with cry and skin color improving.         Cord information: 3 Vessels   Disposition of cord blood: Lab    Blood gases sent? No  Complications: Nuchal   Placenta: Delivered:      Spontaneous    appearance.  Comments: 3 vessel cord.  Disposition: Hospital disposal   Measurements:  Weight:    Height:    Head circumference:    Chest circumference:     Temperature:     Other providers:       Additional  information:  Forceps:    Verbal Informed Consent Obtained:       Alternative Labor Strategies Discussed:     Emergency Resources Available:       Type:       Accrued Pulling Time:       # of Pulls:      Position:     Fetal Station:       Indications:      Other Indications:     Operative Vaginal Delivery Brief Note Forceps:        Vacuum:    Verbal Informed Consent Obtained:     Alternative Labor Strategies Discussed:     Emergency Resources Available:     Type:      Accrued Pulling Time:       # of Pop-Offs:       # of Pulls:       Position:      Fetal Station:      Indications for Vacuum:       Other Indications:    Operative Vaginal Delivery Brief Note Vacuum:        Shoulder Dystocia Shoulder Dystocia    Labor Complications:  GBS Positive   Fetal Tracing Prior to Delivery:  Category 2   Shoulder dystocia present?:  No                                            Breech:       : Type:     Indications for Primary:     Indications for Secondary:     Other Indications:        Observed anomalies     Output in Delivery Room:

## 2017-01-21 NOTE — PROGRESS NOTES
"  S:Patient feels that contractions are getting stronger and would like more fentanyl. Requesting cervical exam.     O:  Blood pressure 106/61, pulse 97, temperature 97.6  F (36.4  C), temperature source Oral, resp. rate 20, height 1.676 m (5' 6\"), weight 87.998 kg (194 lb), last menstrual period 2016, not currently breastfeeding.  General appearance: uncomfortable with contractions    CONTRACTIONS: Contractions every 3-5 minutes.  Palpate: moderate  FETAL HEART TONES: baseline 120 with moderate FHR variability and    accelerations yes. Decelerations yes - early decel while in room. Possible arrythmia noted, sounds like an added beat and picks up as a spike on the fetal heart tracing up to around 180.    ROM: not ruptured  PELVIC EXAM:/-1  Fetal Position:  vertex  Bloody show: No  Pitocin- none,  Antibiotics- PCN, first dose complete, next dose at midnight    ASSESSMENT:  ==============  IUP @ 39w6d active labor   Fetal Heart rate tracing Category two (early decel)  GBS- positive     PLAN:  ===========  Pain medication - discussed risks of getting IV Fentanyl within one hour of birth. Pt verbalizes understanding and would like to proceed with a second dose of Fentanyl.   Prophylactic antibiotic for + GBS status - 2nd dose due at midnight  Anticipate FARHAN Milligan CNM  "

## 2017-01-21 NOTE — PLAN OF CARE
Problem: Goal Outcome Summary  Goal: Goal Outcome Summary  Outcome: Improving  Vitals stable. Pt c/o of pain and cramping. Medicated with oral meds. See MAR. Given hot packs and applied to the abdomen. Pt is able to empty bladder without any difficulties. Is passing gas. No BM. Took scheduled senna this am. Pt is breastfeeding infant with minimal assist on obtaining a deeper latch. Continue to assist with breastfeeding. Continue with plan of care.

## 2017-01-21 NOTE — PLAN OF CARE
Problem: Labor (Cervical Ripen, Induct, Augment) (Adult,Obstetrics,Pediatric)  Goal: Signs and Symptoms of Listed Potential Problems Will be Absent or Manageable (Labor)  Signs and symptoms of listed potential problems will be absent or manageable by discharge/transition of care (reference Labor (Cervical Ripen, Induct, Augment) (Adult,Obstetrics,Pediatric) CPG).   Outcome: Completed Date Met:  17  Vaginal Delivery Note   of viable Male with JONATAN Milligan CNM in attendance.  Nursery SERGEY ZAPATA present.  Infant to mother's abdomen, dried and stimulated.  APGAR: 7 and 9.  Placenta delivered with out complication,30 units IV pitocin started after delivery of placenta, no laceration, ginna cares provided. Mother and baby in stable condition.

## 2017-01-21 NOTE — PLAN OF CARE
Problem: Labor (Cervical Ripen, Induct, Augment) (Adult,Obstetrics,Pediatric)  Goal: Signs and Symptoms of Listed Potential Problems Will be Absent or Manageable (Labor)  Signs and symptoms of listed potential problems will be absent or manageable by discharge/transition of care (reference Labor (Cervical Ripen, Induct, Augment) (Adult,Obstetrics,Pediatric) CPG).  Outcome: Improving  Pt has used fentanyl x3 for pain control, on birthing ball for short time. EFM shows moderate variability with occasional accels, occasional early decels. Heartbeat goes through periods where it goes up briefly to 150s and back down to baseline, within 1-2 seconds. Notified MLehman and at bedside to evaluate but rhythm had stopped when she came to bedside. Rhythm resolved after pt turned to her side. Report to SERGEY Luna. Continue to monitor.

## 2017-01-21 NOTE — PLAN OF CARE
Data: Alisha Marie transferred to  [id number]  via wheelchair at 0315. Baby transferred via parent's arms.  Action: Receiving unit notified of transfer: Yes. Patient and family notified of room change. Report given to SERGEY Fernandez at 0325. Belongings sent to receiving unit. Accompanied by Registered Nurse. Oriented patient to surroundings. Call light within reach. ID bands double-checked with receiving RN.  Response: Patient tolerated transfer and is stable.

## 2017-01-21 NOTE — H&P
ADMIT NOTE  =================  39w6d  Alisha Marie is a 26 year old female     with an Patient's last menstrual period was 2016 (approximate). and Estimated Date of Delivery: 2017 [facility] 2017 at 7:53 PM in active labor.   Fetal movement- active  ROM- no   GBS- positive    HPI  ================  [facility] with . Had membranes stripped in clinic today and cervix was 3cm. No LOF or bleeding. Baby is active.   FOB- is involved, present and supportive  Other labor support- none    PRENATAL COURSE  =================  Prenatal course was   complicated by    Patient Active Problem List    Diagnosis Date Noted     GBS (group B Streptococcus carrier), +RV culture, currently pregnant 2016     Priority: Medium     Tx with abx for labor       Need for Tdap vaccination 2016     Priority: Medium     2016 TDAP given       Anemia 10/17/2016     Priority: Medium     Hgb 9.9 at 24 weeks - left kompanyt message about starting iron supplement. Follow up at next visit. MML    2016 - Hgb 9.6. Will start iron supplement, twice daily for one week and then daily after that. MML  17 9.7 - advised to to take iron BID       HSV (herpes simplex virus) infection 10/14/2016     Priority: Medium     History of HSV    Treated for outbreak 10/10/16    Prophylaxis to start at 36 weeks       Need for rhogam due to Rh negative mother 2016     Priority: Medium     Rhogam at 28 wks       10/14:  Negative antibody screen.  2016 Rhogam given       Supervision of other normal pregnancy, antepartum 2016     Priority: Medium     Chrissy CARPIO Deantae.  Cardiac ICU RN at [facility].  Transfer in from [place] at 19 wks.    Fetal survey with left CPC  MFM US CPC resolved, no further f/u          HISTORIES  ============  No Known Allergies  Past Medical History   Diagnosis Date     NO ACTIVE PROBLEMS      Past Surgical History    Procedure Laterality Date     Lasik     .  Family History   Problem Relation Age of Onset     DIABETES No family hx of      Coronary Artery Disease No family hx of      Hypertension No family hx of      Other Cancer No family hx of      Social History   Substance Use Topics     Smoking status: Never Smoker      Smokeless tobacco: Not on file     Alcohol Use: Yes      Comment: not during pregnancy     Obstetric History       T2      TAB0   SAB0   E0   M0   L2       # Outcome Date GA Lbr Danyel/2nd Weight Sex Delivery Anes PTL Lv   4 Current            3 Term  40w0d  3.26 kg (7 lb 3 oz) F    Y      Name: Ben   2 Term  42w0d  4.082 kg (9 lb) M    Y      Name: Magalie Leon                Obstetric Comments   Partner has daughter also, Noe.         LABS:   ===========  Rhogam indicated and given on 2016  ABO       AB   2016    RH      Neg   2016  Rubella Immune  Anti-trep NEG  HIV NR  HGB      9.7   2017   HEPBANG   Nonreactive   2016  GBS   *   2016    Value: Positive  Positive: GBS DNA detected, presumed positive for GBS.   Assay performed on incubated broth culture of specimen using [employer] real-time   PCR.    ROS  =========  Pt denies significant respiratory, cardiovacular, GI, or muscular/skeletalcomplaints.    See RN data base ROS.     PHYSICAL EXAM:  ===============  Blood pressure 106/61, pulse 97, temperature 97.6  F (36.4  C), temperature source Oral, resp. rate 20, last menstrual period 2016, not currently breastfeeding.  General appearance: uncomfortable with contractions  Heart: RRR without murmur  Lungs: clear to auscultation   Neuro: denies headache and visual disturbances  Psych: Mentation normal and bright   Legs: 2+/2+, no clonus, no edema      Abdomen: gravid, single vertex fetus, non-tender between contractions.  EFW-  7.5 lbs.     NST reactive  CONTRACTIONS: Contractions every 2-3 minutes.  Palpate: moderate  FETAL HEART  TONES: baseline 125 with moderate FHR variability and  pos accelerations. No decelerations present.      PELVIC EXAM: /-1  MIRANDA SCORE: 12  BLOODY SHOW: no   ROM: No  FLUID: none    ASSESSMENT:  ==============  IUP @ 39w6d in active labor   NST REACTIVE  Fetal Heart rate tracing Category category one  GBS- positive     PLAN:  ===========  Admit - see IP orders  Pain medication per patient request - will give one dose of IV fentanyl now  Prophylactic antibiotic for + GBS status  Anticipate    CHELSY MarquisM

## 2017-01-21 NOTE — PROGRESS NOTES
"  S:Patient requesting AROM, feels contractions are getting stronger and wants to get things moving as she is trying to avoid epidural. Reviewed timing of this and that it would be a possibility that baby would be born before the 4 hour gabbie of antibiotic treatment.     O:  Blood pressure 100/57, pulse 97, temperature 97.7  F (36.5  C), temperature source Oral, resp. rate 20, height 1.676 m (5' 6\"), weight 87.998 kg (194 lb), last menstrual period 2016, not currently breastfeeding.  General appearance: uncomfortable with contractions    CONTRACTIONS: Contractions every 3-6 minutes.  Palpate: moderate  FETAL HEART TONES: baseline 120 with moderate FHR variability and    accelerations no. Decelerations no.    ROM: clear fluid  PELVIC EXAM:/-1  Fetal Position:  vertex  Bloody show: No  Pitocin- none,  Antibiotics- PCN    ASSESSMENT:  ==============  IUP @ 39w6d active labor   Fetal Heart rate tracing Category category two  GBS- positive     PLAN:  ===========  AROM now for clear fluid - patient verbalized understanding that this might possibly speed labor and baby would be born before the 4 hour gabbie of antibiotic treatment. She would like to proceed.   Anticipate FARHAN Milligan CNM  "

## 2017-01-21 NOTE — PLAN OF CARE
Problem: Goal Outcome Summary  Goal: Goal Outcome Summary  Outcome: Improving  Transferred to [facility], oriented to room. Educated on signs of bleeding to report. Vital signs and assessments WDL. Patient verbalizes pain is tolerable, given medication (see MAR). Patient has voided, no BM or gas since delivery. Ambulating independently, instructed to call for assistance. Breastfeeding independently, assisted with various positions. FOB present, supportive. Bonding well with baby, positive attachment. Continue to monitor.

## 2017-01-21 NOTE — PROGRESS NOTES
"Post Partum Note  SIGNIFICANT PROBLEMS:  Patient Active Problem List    Diagnosis Date Noted     Need for rhogam due to Rh negative mother 06/27/2016     Priority: Medium     Rhogam at 28 wks       10/14:  Negative antibody screen.  11/25/2016 Rhogam given       Supervision of other normal pregnancy, antepartum 06/25/2016     Priority: Medium     Kofi-VIDHI-marty CARPIO Deantlance.  Cardiac ICU RN at [facility].  Transfer in from [place] at 19 wks.    Fetal survey with left CPC  MFM US CPC resolved, no further f/u       GBS (group B Streptococcus carrier), +RV culture, currently pregnant 12/25/2016     Tx with abx for labor       Need for Tdap vaccination 11/25/2016 11/25/2016 TDAP given       Anemia 10/17/2016     Hgb 9.9 at 24 weeks - left MyChart message about starting iron supplement. Follow up at next visit. MML    12/22/2016 - Hgb 9.6. Will start iron supplement, twice daily for one week and then daily after that. MML  1/20/17 9.7 - advised to to take iron BID       HSV (herpes simplex virus) infection 10/14/2016     History of HSV    Treated for outbreak 10/10/16    Prophylaxis to start at 36 weeks         INTERVAL HISTORY:  /69 mmHg  Pulse 72  Temp(Src) 98.1  F (36.7  C) (Oral)  Resp 16  Ht 1.676 m (5' 6\")  Wt 87.998 kg (194 lb)  BMI 31.33 kg/m2  LMP 04/16/2016 (Approximate)  Breastfeeding? Unknown  Pt stable, baby is rooming in  Breast feeding status:initiated and well established  Complications since 2 hours post delivery: None  Patient is tolerating acitivity well Voiding without difficulty, cramping is relieved by Ibuprophen, lochia is decreasing and patient denies clots.  Perineal pain is is relieved by Ibuprophen.  The perineum is intact.      Postpartum hemoglobin   HEMOGLOBIN   Date Value Ref Range Status   01/20/2017 9.7* 11.7 - 15.7 g/dL Final     Blood type ABO       AB   1/20/2017  RH      Neg   1/20/2017  Rubella status No results found for this basename: RUBELLAABIGG  History of " depression: denies    ASSESSMENT/PLAN:  Normal postpartum exam   Stable Post-partum day #1  Complications:none  Plan d/c home tomorrow  RTC 8 weeks, plan IUD insertion  Teaching done: D/C Instructions: Nutrition/Activity, Engorgement Management, Birth Control Options, Warning Signs/When to Call: Excessive Bleeding, Infection, PP Depression, Kegals and Crunches, RTC Clinic for PP Appointment, PNV and Iron supplemenation  Birthcontrol planned:IUD Mirena, plan to insert at 6-8 wks postpartum  Current Discharge Medication List      CONTINUE these medications which have NOT CHANGED    Details   miconazole (MICATIN) 2 % cream Place 1 applicator vaginally At Bedtime      MetroNIDAZOLE (FLAGYL PO) Take 500 mg by mouth 2 times daily      ondansetron (ZOFRAN) 4 MG tablet Take 1 tablet (4 mg) by mouth every 8 hours as needed for nausea  Qty: 18 tablet, Refills: 0    Associated Diagnoses: Nausea      valACYclovir (VALTREX) 500 MG tablet Take 1 tablet (500 mg) by mouth 2 times daily  Qty: 6 tablet, Refills: 3    Associated Diagnoses: Genital herpes simplex, unspecified site      famotidine (PEPCID) 40 MG tablet Take 1 tablet (40 mg) by mouth nightly as needed for heartburn  Qty: 30 tablet, Refills: 1    Associated Diagnoses: Reflex abnormality      hydrocortisone valerate (WEST-SUSSY) 0.2 % ointment Apply topically 2 times daily  Qty: 30 g, Refills: 1    Associated Diagnoses: Rash and nonspecific skin eruption             Aleena Dover,  Student Nurse Midwife     Present and agree with assessment and charting by SNM. Assessment and plan completed by KAVYA Sunshine CNM CNM

## 2017-01-22 VITALS
DIASTOLIC BLOOD PRESSURE: 56 MMHG | TEMPERATURE: 97.7 F | HEART RATE: 72 BPM | SYSTOLIC BLOOD PRESSURE: 100 MMHG | BODY MASS INDEX: 31.18 KG/M2 | WEIGHT: 194 LBS | RESPIRATION RATE: 16 BRPM | HEIGHT: 66 IN

## 2017-01-22 LAB
ABO + RH BLD: NORMAL
ABO + RH BLD: NORMAL
BLOOD BANK CMNT PATIENT-IMP: NORMAL
DATE RH IMM GL GVN: NORMAL
FETAL CELL SCN BLD QL ROSETTE: NORMAL
HGB BLD-MCNC: 8.9 G/DL (ref 11.7–15.7)
RH IG VIALS RECOM PATIENT: NORMAL

## 2017-01-22 PROCEDURE — 25000132 ZZH RX MED GY IP 250 OP 250 PS 637: Performed by: ADVANCED PRACTICE MIDWIFE

## 2017-01-22 PROCEDURE — 85461 HEMOGLOBIN FETAL: CPT | Performed by: ADVANCED PRACTICE MIDWIFE

## 2017-01-22 PROCEDURE — 25000125 ZZHC RX 250: Performed by: ADVANCED PRACTICE MIDWIFE

## 2017-01-22 PROCEDURE — 85018 HEMOGLOBIN: CPT | Performed by: ADVANCED PRACTICE MIDWIFE

## 2017-01-22 PROCEDURE — 36415 COLL VENOUS BLD VENIPUNCTURE: CPT | Performed by: ADVANCED PRACTICE MIDWIFE

## 2017-01-22 PROCEDURE — 86900 BLOOD TYPING SEROLOGIC ABO: CPT | Performed by: ADVANCED PRACTICE MIDWIFE

## 2017-01-22 PROCEDURE — 86901 BLOOD TYPING SEROLOGIC RH(D): CPT | Performed by: ADVANCED PRACTICE MIDWIFE

## 2017-01-22 RX ORDER — OXYCODONE HYDROCHLORIDE 5 MG/1
5 TABLET ORAL EVERY 4 HOURS PRN
Qty: 10 TABLET | Refills: 0 | Status: SHIPPED | OUTPATIENT
Start: 2017-01-22 | End: 2017-03-15

## 2017-01-22 RX ORDER — FERROUS SULFATE 325(65) MG
325 TABLET ORAL
Qty: 100 TABLET | Refills: 0 | Status: SHIPPED | OUTPATIENT
Start: 2017-01-22 | End: 2017-03-15

## 2017-01-22 RX ADMIN — IBUPROFEN 800 MG: 400 TABLET ORAL at 05:11

## 2017-01-22 RX ADMIN — IRON 325 MG: 65 TABLET ORAL at 08:01

## 2017-01-22 RX ADMIN — OXYCODONE HYDROCHLORIDE 10 MG: 5 TABLET ORAL at 03:20

## 2017-01-22 RX ADMIN — HUMAN RHO(D) IMMUNE GLOBULIN 300 MCG: 300 INJECTION, SOLUTION INTRAMUSCULAR at 11:16

## 2017-01-22 RX ADMIN — SENNOSIDES AND DOCUSATE SODIUM 2 TABLET: 8.6; 5 TABLET ORAL at 08:01

## 2017-01-22 RX ADMIN — ACETAMINOPHEN 650 MG: 325 TABLET, FILM COATED ORAL at 11:16

## 2017-01-22 RX ADMIN — OXYCODONE HYDROCHLORIDE 10 MG: 5 TABLET ORAL at 08:13

## 2017-01-22 RX ADMIN — ACETAMINOPHEN 650 MG: 325 TABLET, FILM COATED ORAL at 05:11

## 2017-01-22 NOTE — PLAN OF CARE
Problem: Goal Outcome Summary  Goal: Goal Outcome Summary  Outcome: Adequate for Discharge Date Met:  01/22/17  Data: Vital signs stable, assessments within normal limits.   No evidence of infection, patient instructed of signs/symptoms to look for and report per discharge instructions.   Discharge outcomes on care plan met.   Action: Review of care plan, teaching, and discharge instructions done with patient. Home meds given to patient.   Response: Patient states understanding and comfortable with self cares. All questions about self care addressed. Patient is discharged with infant on 01/22/2017.

## 2017-01-22 NOTE — PLAN OF CARE
Problem: Goal Outcome Summary  Goal: Goal Outcome Summary  Outcome: Improving  Vital signs and assessments WDL. Patient verbalizes pain is well-controlled with hot packs and medication (see MAR). Has voided, no BM since delivery, passing gas. Ambulating independently, instructed to call for assistance. Breastfeeding independently, encouraged to breast massage and hand express. FOB present, supportive. Needs RhoGam Continue to monitor.

## 2017-01-22 NOTE — PLAN OF CARE
Problem: Goal Outcome Summary  Goal: Goal Outcome Summary  Outcome: No Change  Pt. C/o intense cramping, comfortable with percocet and ibuprofen.  Provider text paged to change to oxycodone, pt has had 3.9g of acetaminophen today.  Voiding spontaneously.

## 2017-01-22 NOTE — DISCHARGE SUMMARY
"Post Partum Note    Alisha Marie      MRN#: [id number]  Age: 26 year old      YOB: 1990      Post-partum day #1    SIGNIFICANT PROBLEMS:  Patient Active Problem List    Diagnosis Date Noted     GBS (group B Streptococcus carrier), +RV culture, currently pregnant 2016     Priority: Medium     Tx with abx for labor       Need for Tdap vaccination 2016     Priority: Medium     2016 TDAP given       Anemia 10/17/2016     Priority: Medium     Hgb 9.9 at 24 weeks - left [employer]hart message about starting iron supplement. Follow up at next visit. MML    2016 - Hgb 9.6. Will start iron supplement, twice daily for one week and then daily after that. MML  17 9.7 - advised to to take iron BID       HSV (herpes simplex virus) infection 10/14/2016     Priority: Medium     History of HSV    Treated for outbreak 10/10/16    Prophylaxis to start at 36 weeks       Need for rhogam due to Rh negative mother 2016     Priority: Medium     Rhogam at 28 wks       10/14:  Negative antibody screen.  2016 Rhogam given       Supervision of other normal pregnancy, antepartum 2016     Priority: Medium     Chrissy CARPIO Deantae.  Cardiac ICU RN at [facility].  Transfer in from [place] at 19 wks.    Fetal survey with left CPC  MFM US CPC resolved, no further f/u         INTERVAL HISTORY:  /56 mmHg  Pulse 72  Temp(Src) 97.7  F (36.5  C) (Oral)  Resp 16  Ht 1.676 m (5' 6\")  Wt 87.998 kg (194 lb)  BMI 31.33 kg/m2  LMP 2016 (Approximate)  Breastfeeding? Unknown    Pt stable, baby is rooming in  Breast feeding status:initiated  Complications since 2 hours post delivery: None  Patient is tolerating acitivity well, voiding without difficulty, cramping is minimal, is relieved by Ibuprophen and is relieved by narcotics, lochia is decreasing and patient denies clots.  Perineal pain is is minimal and is relieved by Ibuprophen.  The perineum is intact    Normal " postpartum exam     Postpartum hemoglobin   HEMOGLOBIN   Date Value Ref Range Status   2017 8.9* 11.7 - 15.7 g/dL Final     Blood type ABO   Pending   2017  RH   Pending   2017  Rubella immune    ASSESSMENT/PLAN:  Stable Post-partum day #1  Complications:anemic, plan for iron supplementation  Plan d/c home today  RTC 8 weeks, plan IUD insertion  Teaching done: Birth Control Options, Warning Signs/When to Call: Excessive Bleeding, Infection, PP Depression, RTC Clinic for PP Appointment, PNV and Iron supplemenation    Postpartum warning s/s reviewed, including bleeding/clots, fever, mastitis, or depression    Birthcontrol planned:IUD Mirena, plan to insert at 8 wks postpartum  Current Discharge Medication List      START taking these medications    Details   oxyCODONE (ROXICODONE) 5 MG IR tablet Take 1 tablet (5 mg) by mouth every 4 hours as needed for moderate to severe pain  Qty: 10 tablet, Refills: 0    Associated Diagnoses:  (normal spontaneous vaginal delivery)      !! ferrous sulfate (IRON SUPPLEMENT) 325 (65 FE) MG tablet Take 1 tablet (325 mg) by mouth daily (with breakfast)  Qty: 100 tablet, Refills: 0    Associated Diagnoses: Iron deficiency anemia secondary to inadequate dietary iron intake      !! ferrous sulfate (IRON) 325 (65 FE) MG tablet Take 1 tablet (325 mg) by mouth daily (with breakfast)  Qty: 60 tablet, Refills: 0    Associated Diagnoses: Iron deficiency anemia secondary to inadequate dietary iron intake      senna-docusate (SENOKOT-S;PERICOLACE) 8.6-50 MG per tablet Take 1 tablet by mouth 2 times daily as needed for constipation  Qty: 60 tablet, Refills: 1    Associated Diagnoses:  (normal spontaneous vaginal delivery)       !! - Potential duplicate medications found. Please discuss with provider.      CONTINUE these medications which have NOT CHANGED    Details   famotidine (PEPCID) 40 MG tablet Take 1 tablet (40 mg) by mouth nightly as needed for heartburn  Qty: 30  tablet, Refills: 1    Associated Diagnoses: Reflex abnormality      hydrocortisone valerate (WEST-SUSSY) 0.2 % ointment Apply topically 2 times daily  Qty: 30 g, Refills: 1    Associated Diagnoses: Rash and nonspecific skin eruption         STOP taking these medications       miconazole (MICATIN) 2 % cream Comments:   Reason for Stopping:         MetroNIDAZOLE (FLAGYL PO) Comments:   Reason for Stopping:         ondansetron (ZOFRAN) 4 MG tablet Comments:   Reason for Stopping:         valACYclovir (VALTREX) 500 MG tablet Comments:   Reason for Stopping:           Discussed comfort care measures for hemorrhoids, reasons to call. Will give Oxycodone 5mg #10 for cramping but explained that if it is still enough to require narcotics in 5-7 days from now I would want to evaluate her in clinic. Patient agrees to plan and verbalizes understanding.   Dania Milligan CNM

## 2017-03-15 ENCOUNTER — RESULT FOLLOW UP (OUTPATIENT)
Dept: MIDWIFE SERVICES | Facility: CLINIC | Age: 27
End: 2017-03-15

## 2017-03-15 ENCOUNTER — PRENATAL OFFICE VISIT (OUTPATIENT)
Dept: MIDWIFE SERVICES | Facility: CLINIC | Age: 27
End: 2017-03-15
Payer: COMMERCIAL

## 2017-03-15 VITALS
DIASTOLIC BLOOD PRESSURE: 73 MMHG | HEART RATE: 64 BPM | BODY MASS INDEX: 27.98 KG/M2 | TEMPERATURE: 97.2 F | SYSTOLIC BLOOD PRESSURE: 109 MMHG | HEIGHT: 66 IN | WEIGHT: 174.1 LBS

## 2017-03-15 DIAGNOSIS — Z11.3 SCREENING EXAMINATION FOR VENEREAL DISEASE: ICD-10-CM

## 2017-03-15 DIAGNOSIS — R87.610 PAPANICOLAOU SMEAR OF CERVIX WITH ATYPICAL SQUAMOUS CELLS OF UNDETERMINED SIGNIFICANCE (ASC-US): ICD-10-CM

## 2017-03-15 DIAGNOSIS — Z30.430 ENCOUNTER FOR IUD INSERTION: ICD-10-CM

## 2017-03-15 DIAGNOSIS — F43.23 ADJUSTMENT DISORDER WITH MIXED ANXIETY AND DEPRESSED MOOD: ICD-10-CM

## 2017-03-15 DIAGNOSIS — Z12.4 CERVICAL CANCER SCREENING: Primary | ICD-10-CM

## 2017-03-15 DIAGNOSIS — Z97.5 PRESENCE OF INTRAUTERINE CONTRACEPTIVE DEVICE: ICD-10-CM

## 2017-03-15 LAB — BETA HCG QUAL IFA URINE: NEGATIVE

## 2017-03-15 PROCEDURE — G0145 SCR C/V CYTO,THINLAYER,RESCR: HCPCS | Performed by: ADVANCED PRACTICE MIDWIFE

## 2017-03-15 PROCEDURE — 87591 N.GONORRHOEAE DNA AMP PROB: CPT | Performed by: ADVANCED PRACTICE MIDWIFE

## 2017-03-15 PROCEDURE — G0124 SCREEN C/V THIN LAYER BY MD: HCPCS | Performed by: ADVANCED PRACTICE MIDWIFE

## 2017-03-15 PROCEDURE — 58300 INSERT INTRAUTERINE DEVICE: CPT | Performed by: ADVANCED PRACTICE MIDWIFE

## 2017-03-15 PROCEDURE — 84703 CHORIONIC GONADOTROPIN ASSAY: CPT | Performed by: ADVANCED PRACTICE MIDWIFE

## 2017-03-15 PROCEDURE — 87491 CHLMYD TRACH DNA AMP PROBE: CPT | Performed by: ADVANCED PRACTICE MIDWIFE

## 2017-03-15 PROCEDURE — 99207 ZZC POST PARTUM EXAM: CPT | Performed by: ADVANCED PRACTICE MIDWIFE

## 2017-03-15 RX ORDER — FLUOXETINE 10 MG/1
10 TABLET, FILM COATED ORAL DAILY
Qty: 30 TABLET | Refills: 0 | Status: SHIPPED | OUTPATIENT
Start: 2017-03-15

## 2017-03-15 NOTE — LETTER
March 2, 2018      Alisha Henleyjaylen  [address]   [place] [address]    Dear ,      At [facility], your health and wellness is our primary concern. That is why we are following up on an abnormal pap from 3/16/17, which was reported as ASCUS. Your provider had recommended that you have a Pap smear completed by 3/16/18. Our records do not show that this has been scheduled.    It is important to complete the follow up that your provider has suggested for you to ensure that there are no worsening changes which may, over time, develop into cancer.      Please contact our office at  [phone] to schedule an appointment for a Pap smear at your earliest convenience. If you have questions or concerns, please call the clinic and we will be happy to assist you.    If you have completed the tests outside of [facility], please have the results forwarded to our office. We will update the chart for your primary Physician to review before your next annual physical.     Thank you for choosing [facility]!    Sincerely,      CHELSY Rodriguez CNM/renate

## 2017-03-15 NOTE — MR AVS SNAPSHOT
After Visit Summary   3/15/2017    Alisha Marie    MRN: [id number]           Patient Information     Date Of Birth          1990        Visit Information        Provider Department      3/15/2017 3:30 PM Ambrose Amin APRN [facility] [place]        Today's Diagnoses     Cervical cancer screening    -  1    Adjustment disorder with mixed anxiety and depressed mood        Encounter for IUD insertion          Care Instructions    What Mirena Users May Expect    What to watch for right after Mirena is placed  Some women may experience uterine cramps, bleeding, and/or dizziness during and right after Mirena is placed. To help minimize the cramps, you may taken ibuprofen 600 mg with food prior to your appointment. These symptoms should improve over the next 24 hours.  Mild cramping may be present for a few days after your placement  As a follow up, you should check your strings on 4 weeks or visit your clinic once in the first 4 to 12 weeks after Mirena is placed to make sure it is in the right position. After that, Mirena can be checked once a year as part of your routine exam.    Please use a back-up method (abstinence or condoms) for 5 days after placement.    Your periods may change  For the first 3 to 6 months, your monthly period may become irregular. You may also have frequent spotting or light bleeding. A few women have heavy bleeding during this time. After your body adjusts, the number of bleeding days is likely to decrease (but may remain irregular), and you may even find that your periods stop altogether for as long as Mirena is in place. Around the end of the third month of use, you may see up to a 75% reduction in the amount of menstrual bleeding. By one year, about 1 out of 5 users may hay have no period at all. At the end of two years, 70% have little or no bleeding. Your periods will return rapidly once Mirena is removed.     Mirena Strings  You may check your own Mirena  strings by inserting a finger into the vagina and feeling the strings as they exit the cervix.  The strings will initially feel firm, like fishing line, but will soften over a few weeks.  After the strings have softened, you or your partner should not be able to feel the strings during intercourse.  If you can feel the IUD, see your healthcare provider to have the position confirmed.  You may use tampons with Mirena in place.    Mirena does not protect against HIV or STDs.  Mirena does not prevent the formation of ovarian cysts.  Mirena does not typically reduce acne or cause weight gain or mood changes.    Please call [facility] at [phone] if you have questions or concerns.    For more information:  [email or web address]          Follow-ups after your visit        Who to contact     If you have questions or need follow up information about today's clinic visit or your schedule please contact [facility] [place] directly at [phone].  Normal or non-critical lab and imaging results will be communicated to you by [facility], letter or phone within 4 business days after the clinic has received the results. If you do not hear from us within 7 days, please contact the clinic through [employer] or phone. If you have a critical or abnormal lab result, we will notify you by phone as soon as possible.  Submit refill requests through [employer] or call your pharmacy and they will forward the refill request to us. Please allow 3 business days for your refill to be completed.          Additional Information About Your Visit        [employer] Information     [employer] gives you secure access to your electronic health record. If you see a primary care provider, you can also send messages to your care team and make appointments. If you have questions, please call your primary care clinic.  If you do not have a primary care provider, please call [phone] and they will assist you.        Care EveryWhere  "ID     This is your Care EveryWhere ID. This could be used by other organizations to access your [facility] medical records  [id number]        Your Vitals Were     Pulse Temperature Height Breastfeeding? BMI (Body Mass Index)       64 97.2  F (36.2  C) (Oral) 5' 6\" (1.676 m) Yes 28.1 kg/m2        Blood Pressure from Last 3 Encounters:   03/15/17 109/73   01/22/17 100/56   01/20/17 91/67    Weight from Last 3 Encounters:   03/15/17 174 lb 1.6 oz (79 kg)   01/20/17 194 lb (88 kg)   01/20/17 194 lb (88 kg)              Today, you had the following     No orders found for display       Primary Care Provider    Physician No Ref-Primary       No address on file        Thank you!     [place] you [place] [facility] [place]. Our goal is always to provide you with excellent care. Hearing back from our patients is one way we can continue to improve our services. Please take a few minutes to complete the written survey that you may receive in the mail after your visit with us. Thank you!             Your Updated Medication List - Protect others around you: Learn how to safely use, store and throw away your medicines at www.disposemymeds.org.          This list is accurate as of: 3/15/17  3:59 PM.  Always use your most recent med list.                   Brand Name Dispense Instructions for use    famotidine 40 MG tablet    PEPCID    30 tablet    Take 1 tablet (40 mg) by mouth nightly as needed for heartburn       hydrocortisone valerate 0.2 % ointment    WEST-SUSSY    30 g    Apply topically 2 times daily       senna-docusate 8.6-50 MG per tablet    SENOKOT-S;PERICOLACE    60 tablet    Take 1 tablet by mouth 2 times daily as needed for constipation         "

## 2017-03-15 NOTE — NURSING NOTE
"Chief Complaint   Patient presents with     Postpartum Care     IUD     Mirena. NDC: [id number] LOT: [id number] EXP: 2019       Initial /73  Pulse 64  Temp 97.2  F (36.2  C) (Oral)  Ht 5' 6\" (1.676 m)  Wt 174 lb 1.6 oz (79 kg)  Breastfeeding? Yes  BMI 28.1 kg/m2 Estimated body mass index is 28.1 kg/(m^2) as calculated from the following:    Height as of this encounter: 5' 6\" (1.676 m).    Weight as of this encounter: 174 lb 1.6 oz (79 kg).  BP completed using cuff size: regular        The following HM Due: pap smear      The following patient reported/Care Every where data was sent to:  P ABSTRACT QUALITY INITIATIVES [[id number]]       patient has appointment for today    Sia Layne CMA               "

## 2017-03-15 NOTE — NURSING NOTE
"Chief Complaint   Patient presents with     Postpartum Care       Initial /73  Pulse 64  Temp 97.2  F (36.2  C) (Oral)  Ht 5' 6\" (1.676 m)  Wt 174 lb 1.6 oz (79 kg)  Breastfeeding? Yes  BMI 28.1 kg/m2 Estimated body mass index is 28.1 kg/(m^2) as calculated from the following:    Height as of this encounter: 5' 6\" (1.676 m).    Weight as of this encounter: 174 lb 1.6 oz (79 kg).  BP completed using cuff size: regular        The following HM Due: pap smear      The following patient reported/Care Every where data was sent to:  P ABSTRACT QUALITY INITIATIVES [[id number]]       patient has appointment for today    Sia Layne CMA               "

## 2017-03-15 NOTE — LETTER
[facility]  [address] [place] [address]  [facility] [id number] [phone]      March 20, 2017    Alisha Marie  [address]   [place] [address]    Dear Alisha,  This letter is in regards to the PAP smear you had done on 03/15/17. The test result is stated to be ASCUS or Atypical Squamous Cells of Undetermined Significance. This indicates a mild change only. The vast majority of patients with this result do not have significant cervical abnormalities.   Therefore, current guidelines recommend you return in 1 year for a repeat pap smear.  Please call me at [phone] if you have any questions or concerns regarding your result or follow up recommendations.  Sincerely,  CHELSY Rodriguez CNM./  Karrie Antonio  Pap Tracking RN

## 2017-03-16 ASSESSMENT — PATIENT HEALTH QUESTIONNAIRE - PHQ9: SUM OF ALL RESPONSES TO PHQ QUESTIONS 1-9: 23

## 2017-03-17 LAB
C TRACH DNA SPEC QL NAA+PROBE: NORMAL
N GONORRHOEA DNA SPEC QL NAA+PROBE: NORMAL
SPECIMEN SOURCE: NORMAL
SPECIMEN SOURCE: NORMAL

## 2017-03-19 PROBLEM — F43.23 ADJUSTMENT DISORDER WITH MIXED ANXIETY AND DEPRESSED MOOD: Status: ACTIVE | Noted: 2017-03-19

## 2017-03-20 PROBLEM — R87.610 PAPANICOLAOU SMEAR OF CERVIX WITH ATYPICAL SQUAMOUS CELLS OF UNDETERMINED SIGNIFICANCE (ASC-US): Status: ACTIVE | Noted: 2017-03-13

## 2017-03-20 PROBLEM — R87.610 PAPANICOLAOU SMEAR OF CERVIX WITH ATYPICAL SQUAMOUS CELLS OF UNDETERMINED SIGNIFICANCE (ASC-US): Status: ACTIVE | Noted: 2017-03-15

## 2017-03-20 LAB
COPATH REPORT: ABNORMAL
PAP: ABNORMAL

## 2017-03-20 NOTE — PROGRESS NOTES
SUBJECTIVE: Alisha is here for a 6-week postpartum checkup.    Delivery date was 17. She had a  of a viable boy,  with Breast fed complications.  Since delivery, she has been breast feeding.  She has no signs of infection, bleeding or other complications.    We discussed contraception and she has chosen Mirena IUD.  She  has had intercourse since delivery and complains of No discomfort. Patient screened for postpartum depression and complaints are positive for anxiety and depression.    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  Not suicidal with a plan and denies any guns or weapons at the home  Is desiring help with this as she knows it can effect her job performance in a already volatile situation with incarcerated patients.    Has had depression in the past as a teen.   Feels it is both anxiety and depression.       Screening has also been completed for intimate partner violence.    EXAM:  GENERAL healthy, alert and mild distress  GYN PELVIC: normal external genitalia, normal groin lymphatics and normal urethral meatus  PSYCH: positive for anxiety and depression    ASSESSMENT:   Normal postpartum exam after .    PLAN:  Return as needed or at time interval for next routine pap, pelvic, or breast exam.  Encourage Kegals and abdominal exercise.  Continue a multi vitamin supplement, especially if breastfeeding.  Pap smear was obtained.  Post partum Hgb was not obtained.    PROCEDURE NOTE:    CC/HPI: Alisha Marie is a 26 year old  female.   No LMP recorded..  Today's pregnancy test negative.  She is here for an IUD insertion.  Patient has been given written information.  I have reviewed the risks of the IUD including  pregnancy, PID, life threatening infection, perforation, expulsion, cramping, changes in bleeding and ovarian cysts. Benefits of the IUD and alternative family planning methods have been discussed.  Patients questions are answered.  Patient has verbalized understanding of risks and benefits and has signed the consent form.    No Known Allergies  Current Outpatient Prescriptions   Medication Sig Dispense Refill     FLUoxetine (PROZAC) 10 MG tablet Take 1 tablet (10 mg) by mouth daily 30 tablet 0     levonorgestrel (MIRENA) 20 MCG/24HR IUD 1 each (20 mcg) by Intrauterine route once for 1 dose 1 each 0     senna-docusate (SENOKOT-S;PERICOLACE) 8.6-50 MG per tablet Take 1 tablet by mouth 2 times daily as needed for constipation 60 tablet 1     hydrocortisone valerate (WEST-SUSSY) 0.2 % ointment Apply topically 2 times daily 30 g 1     famotidine (PEPCID) 40 MG tablet Take 1 tablet (40 mg) by mouth nightly as needed for heartburn 30 tablet 1      Past Medical History   Diagnosis Date     NO ACTIVE PROBLEMS      Family History   Problem Relation Age of Onset     DIABETES No family hx of      Coronary Artery Disease No family hx of      Hypertension No family hx of      Other Cancer No family hx of      Social History     Social History     Marital status: Single     Spouse name: partner- Raoul     Number of children: 2     Years of education: N/A     Occupational History     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 No     Social History Narrative     Past Surgical History   Procedure Laterality Date     Lasik         REVIEW OF SYSTEMS  CONSTITUTIONAL: Denies fever, chills and night sweats  SKIN: Denies changes and suspicious moles  "or lesions.  MUSCULOSKELETAL: Denies joint stiffness, pain and restricted motion.  ENT: Denies blurred vision, hearing loss, tinnitus, frequent colds, epistaxis, hoarseness and tooth pain.  BREASTS:  Denies discharge and lumps.    HEART/LUNGS: Denies dyspnea, wheezing, coughing and chest pain.  HEMATOLOGIC: Denies tendency to bruise and history of blood clots.  GASTROINTESTINAL: Denies heartburn, indigestion and change of color in stools.   GENITOURINARY:  Denies urgency, dysuria and hematuria.  NEUROLOGIC:  Denies seizures, weakness and fainting.  PSYCHIATRIC: Negative for changes in mood or affect          EXAM:  /73  Pulse 64  Temp 97.2  F (36.2  C) (Oral)  Ht 5' 6\" (1.676 m)  Wt 174 lb 1.6 oz (79 kg)  Breastfeeding? Yes  BMI 28.1 kg/m2  Abdomen: soft, nontender, without hepatosplenomegaly or masses  : normal cervix, adnexae, and uterus without masses or discharge  IUD type: Mirena  Lot # [id number] EXP: 09/2019    ASSESSMENT/ PLAN:  (Z12.4) Cervical cancer screening  (primary encounter diagnosis)  Comment:   Plan: Pap imaged thin layer screen only - recommended        age 21 - 24 years            (F43.23) Adjustment disorder with mixed anxiety and depressed mood  Comment: \  PHQ (  Plan: FLUoxetine (PROZAC) 10 MG tablet        Titrate up to 20 mg    (Z30.430) Encounter for IUD insertion  Comment: Neg  Plan: Beta HCG qual IFA urine, levonorgestrel         (MIRENA) 20 MCG/24HR IUD            (Z11.3) Screening examination for venereal disease  Comment:   Plan: NEISSERIA GONORRHOEA PCR, CHLAMYDIA TRACHOMATIS        PCR            (Z97.5) Presence of intrauterine contraceptive device  Comment: Mirena 3/15/17     Plan: levonorgestrel (MIRENA) 20 MCG/24HR IUD            GC/Chlamydia Screening:  YES  Procedure:  Uterus assessed for position and is Retroverted.  Speculum inserted.  Betadine prep of cervix done.  Tenaculum applied at 2 and 10 o'clock position and gentle traction was appiled to elongate the cervical " canal.  Uterus sounded to 8 cm's.  Cervical dilators not used.   IUD inserted in the usual fashion without problems, ie: resistance, severe protracted pain or excessive bleeding.  Tenaculum was removed with there was a significant amount of bleeding from the tenaculum site that was managed with some direct pressure using Palmer swabs a number of times until bleeding slowed and then silver nitrite was applied over the tenaculum site for cauterization of the puncture sites.  Strings trimmed to 2 cm's.  Patient tolerated the procedure  well without any prolonged pain or syncopy.    Instructions given to patient regarding checking IUD strings, returning to the clinic if pain or inability to check strings and/or irregular bleeding.

## 2017-03-20 NOTE — PROGRESS NOTES
03/15/17: ASCUS pap, pt's age 26.Plan pap in 1 year. Letter sent to the pt with the results and recommendations. Pt isn't viewing her LEPOW messages.  3/2/18 Pap reminder letter sent ([facility])  9/28/18 reminder call - [place]. [place]  11/02/18 Patient is lost to pap tracking follow-up. FYI routed to provider. ([facility])

## 2017-04-13 DIAGNOSIS — N89.8 VAGINAL DISCHARGE: ICD-10-CM

## 2017-04-13 NOTE — TELEPHONE ENCOUNTER
clotrimazole (CLOTRIMAZOLE-7) 1 % vaginal cream      Last Written Prescription Date:  10/3/16  Last Fill Quantity: 15,   # refills: 1  Last Office Visit with [facility], [facility] or [facility] prescribing provider: 10/10/16  Future Office visit:       Routing refill request to provider for review/approval because:  Drug not active on patient's medication list

## 2017-04-14 RX ORDER — CLOTRIMAZOLE 10 MG/G
CREAM VAGINAL
Qty: 45 G | Refills: 0 | Status: SHIPPED | OUTPATIENT
Start: 2017-04-14

## 2018-04-15 ENCOUNTER — HEALTH MAINTENANCE LETTER (OUTPATIENT)
Age: 28
End: 2018-04-15

## 2018-09-28 ENCOUNTER — TELEPHONE (OUTPATIENT)
Dept: MIDWIFE SERVICES | Facility: CLINIC | Age: 28
End: 2018-09-28

## 2018-09-28 NOTE — TELEPHONE ENCOUNTER
Pt is past due for physical, f/u pap smear/HPV test.  1 reminder letter sent previously.  Spoke to patient. She agrees to schedule.   If no appt within two weeks (10/12/18) patient will be considered lost to pap tracking f/u.    SLY WorleyN, RN, Pap Tracking Nurse

## 2018-11-05 NOTE — PROGRESS NOTES
Lost to pap follow up.  Will place in problem list to see if pap can be done next time pt accesses care in [facility] system.   ESHA

## 2020-03-10 ENCOUNTER — HEALTH MAINTENANCE LETTER (OUTPATIENT)
Age: 30
End: 2020-03-10

## 2020-12-27 ENCOUNTER — HEALTH MAINTENANCE LETTER (OUTPATIENT)
Age: 30
End: 2020-12-27

## 2021-04-24 ENCOUNTER — HEALTH MAINTENANCE LETTER (OUTPATIENT)
Age: 31
End: 2021-04-24

## 2021-10-04 ENCOUNTER — HEALTH MAINTENANCE LETTER (OUTPATIENT)
Age: 31
End: 2021-10-04

## 2022-05-15 ENCOUNTER — HEALTH MAINTENANCE LETTER (OUTPATIENT)
Age: 32
End: 2022-05-15

## 2022-09-11 ENCOUNTER — HEALTH MAINTENANCE LETTER (OUTPATIENT)
Age: 32
End: 2022-09-11

## 2023-06-03 ENCOUNTER — HEALTH MAINTENANCE LETTER (OUTPATIENT)
Age: 33
End: 2023-06-03

## 2024-11-14 NOTE — PATIENT INSTRUCTIONS
----- Message from Maggi sent at 11/14/2024  8:03 AM CST -----  Contact: self  [phone]  Type:  Test Results    Who Called: Joycelyn  Name of Test (Lab/Mammo/Etc): labs  Date of Test: 11/13  Ordering Provider: Monique  Where the test was performed: [facility]  Would the patient rather a call back or a response via [facility]? Call back   Best Call Back Number:  [phone]  Additional Information:  patient called in this morning stating she saw something concerning on her labs b12 and she would like a call back to discuss it.   [phone] thanks zeke   What Mirena Users May Expect    What to watch for right after Mirena is placed  Some women may experience uterine cramps, bleeding, and/or dizziness during and right after Mirena is placed. To help minimize the cramps, you may taken ibuprofen 600 mg with food prior to your appointment. These symptoms should improve over the next 24 hours.  Mild cramping may be present for a few days after your placement  As a follow up, you should check your strings on 4 weeks or visit your clinic once in the first 4 to 12 weeks after Mirena is placed to make sure it is in the right position. After that, Mirena can be checked once a year as part of your routine exam.    Please use a back-up method (abstinence or condoms) for 5 days after placement.    Your periods may change  For the first 3 to 6 months, your monthly period may become irregular. You may also have frequent spotting or light bleeding. A few women have heavy bleeding during this time. After your body adjusts, the number of bleeding days is likely to decrease (but may remain irregular), and you may even find that your periods stop altogether for as long as Mirena is in place. Around the end of the third month of use, you may see up to a 75% reduction in the amount of menstrual bleeding. By one year, about 1 out of 5 users may hay have no period at all. At the end of two years, 70% have little or no bleeding. Your periods will return rapidly once Mirena is removed.     Mirena Strings  You may check your own Mirena strings by inserting a finger into the vagina and feeling the strings as they exit the cervix.  The strings will initially feel firm, like fishing line, but will soften over a few weeks.  After the strings have softened, you or your partner should not be able to feel the strings during intercourse.  If you can feel the IUD, see your healthcare provider to have the position confirmed.  You may use tampons with Mirena in place.    Mirena does not protect against  HIV or STDs.  Mirena does not prevent the formation of ovarian cysts.  Mirena does not typically reduce acne or cause weight gain or mood changes.    Please call [facility] [place] at [phone] if you have questions or concerns.    For more information:  [email or web address]